# Patient Record
Sex: MALE | Race: OTHER | HISPANIC OR LATINO | Employment: OTHER | URBAN - METROPOLITAN AREA
[De-identification: names, ages, dates, MRNs, and addresses within clinical notes are randomized per-mention and may not be internally consistent; named-entity substitution may affect disease eponyms.]

---

## 2024-01-26 ENCOUNTER — HOSPITAL ENCOUNTER (EMERGENCY)
Facility: HOSPITAL | Age: 64
Discharge: HOME | End: 2024-01-26
Attending: EMERGENCY MEDICINE

## 2024-01-26 VITALS
RESPIRATION RATE: 18 BRPM | HEART RATE: 66 BPM | HEIGHT: 65 IN | SYSTOLIC BLOOD PRESSURE: 144 MMHG | OXYGEN SATURATION: 98 % | DIASTOLIC BLOOD PRESSURE: 84 MMHG | TEMPERATURE: 99 F | WEIGHT: 190 LBS | BODY MASS INDEX: 31.65 KG/M2

## 2024-01-26 DIAGNOSIS — M25.50 ARTHRALGIA, UNSPECIFIED JOINT: Primary | ICD-10-CM

## 2024-01-26 DIAGNOSIS — R52 PAIN: ICD-10-CM

## 2024-01-26 PROCEDURE — 99284 EMERGENCY DEPT VISIT MOD MDM: CPT

## 2024-01-26 RX ORDER — INDOMETHACIN 25 MG/1
25 CAPSULE ORAL 3 TIMES DAILY PRN
Qty: 12 CAPSULE | Refills: 0 | Status: SHIPPED | OUTPATIENT
Start: 2024-01-26

## 2024-01-26 RX ORDER — TRAMADOL HYDROCHLORIDE 50 MG/1
50 TABLET ORAL EVERY 6 HOURS PRN
Qty: 12 TABLET | Refills: 0 | Status: SHIPPED | OUTPATIENT
Start: 2024-01-26

## 2024-01-26 NOTE — ED TRIAGE NOTES
Pt complaint of swelling to right wrist over the past month that has worsened and pain to right ankle without known recent injury

## 2024-01-26 NOTE — Clinical Note
"Chad Schuster (Noel) was seen and treated in our emergency department on 1/26/2024.  He may return to work on 01/29/2024.       If you have any questions or concerns, please don't hesitate to call.      Meena QUICK    "

## 2024-01-26 NOTE — DISCHARGE INSTRUCTIONS
¡Sissy por dejarnos cuidar de usted hoy! Nuestro objetivo es brindarle remi atención norm y mantenerlo cómodo e informado. Si tiene alguna pregunta antes de partir, estaré encantado de intentar responderla.    Aquí tienes algunos consejos después de tu visita:      Wilks visita al departamento de emergencias NO es atención definitiva; realice un seguimiento con wilks médico de atención primaria y/o especialista dentro de 1 semana. Por favor regrese si wilks condición empeora o si tiene alguna otra inquietud.    Si se sometió a exámenes de radiología clark remi ANNA X o remi tomografía computarizada en el departamento de emergencias, la interpretación de ellos puede ser preliminar; es posible que haya resultados menos urgentes en los informes. Obtenga estos informes dentro de las 24 horas del hospital o utilizando wilks teléfono móvil. teléfono para acceder a los registros. Llévelos a wilks médico de atención primaria y/o especialista para remi revisión más detallada de los hallazgos incidentales.    Si le recetaron MEDICAMENTOS PARA EL DOLOR OPIÁTICOS, comprenda que estos medicamentos pueden ser adictivos, puede surtir menos cantidad de la receta indicada y no es necesario que tome la receta completa. Puedes desechar lo que no utilices. Busque ayuda si adrienne que tiene problemas de adicción. No conduzca ni opere maquinaria pesada si está tomando medicamentos sedantes. No mezcle estos medicamentos con alcohol.

## 2024-01-26 NOTE — ED PROVIDER NOTES
Encounter Date: 1/26/2024       History     Chief Complaint   Patient presents with    Hand Pain     Pt complaint of swelling to right wrist over the past month that has worsened and pain to right ankle without known recent injury     63 y.o. male presents to Emergency Department with a chief complaint of R wrist pain. Symptoms began 1 month ago and have been recurrent since onset. Associated symptoms include R ankle pain. Symptoms are aggravated with palpation and exertion and there are no alleviating factors. The patient denies CP, SOB, recent injury, dizziness, fever, or abdominal pain. No other reported symptoms at this time      The history is provided by the patient. A  was used.   General Illness   The current episode started several weeks ago. The problem occurs continuously. The problem has been unchanged. Pertinent negatives include no fever, no photophobia, no abdominal pain, no nausea, no vomiting, no stridor, no shortness of breath, no URI, no wheezing, no rash and no diaper rash. He has received no recent medical care.     Review of patient's allergies indicates:  No Known Allergies  History reviewed. No pertinent past medical history.  History reviewed. No pertinent surgical history.  No family history on file.     Review of Systems   Constitutional:  Negative for chills, fatigue and fever.   Eyes:  Negative for photophobia and visual disturbance.   Respiratory:  Negative for shortness of breath, wheezing and stridor.    Cardiovascular:  Negative for chest pain, palpitations and leg swelling.   Gastrointestinal:  Negative for abdominal pain, nausea and vomiting.   Musculoskeletal:  Positive for arthralgias and joint swelling.   Skin:  Negative for rash.   All other systems reviewed and are negative.      Physical Exam     Initial Vitals [01/26/24 1130]   BP Pulse Resp Temp SpO2   (!) 144/84 66 18 98.6 °F (37 °C) 98 %      MAP       --         Physical Exam    Nursing note and  vitals reviewed.  Constitutional: He appears well-developed and well-nourished. He is not diaphoretic. He is cooperative.  Non-toxic appearance. No distress.   HENT:   Head: Normocephalic and atraumatic.   Right Ear: External ear normal.   Left Ear: External ear normal.   Nose: Nose normal.   Mouth/Throat: Oropharynx is clear and moist.   Eyes: Conjunctivae and EOM are normal. Pupils are equal, round, and reactive to light.   Neck: Neck supple.   Normal range of motion.  Cardiovascular:  Normal rate, regular rhythm, S1 normal, S2 normal, normal heart sounds, intact distal pulses and normal pulses.           Pulmonary/Chest: Effort normal and breath sounds normal. No tachypnea and no bradypnea. No respiratory distress. He has no decreased breath sounds. He has no wheezes. He has no rhonchi. He has no rales. He exhibits no tenderness.   Abdominal: Abdomen is soft. Bowel sounds are normal. He exhibits no distension. There is no abdominal tenderness. There is no rebound.   Musculoskeletal:         General: Tenderness present. Normal range of motion.      Right wrist: Tenderness present. No swelling or effusion. Normal range of motion.      Left wrist: Normal.      Cervical back: Normal range of motion and neck supple.      Right ankle: Swelling present. No ecchymosis. Tenderness present. Normal range of motion. Normal pulse.      Left ankle: Normal.      Comments: Tenderness noted to R wrist and R ankle. Mild swelling to R ankle. Full 5/5 ROM noted. CMS intact. All other adjacent joints otherwise normal.        Neurological: He is alert and oriented to person, place, and time. He has normal strength. No sensory deficit. GCS score is 15. GCS eye subscore is 4. GCS verbal subscore is 5. GCS motor subscore is 6.   Skin: Skin is warm and dry. Capillary refill takes less than 2 seconds. No rash noted. No erythema.   Psychiatric: He has a normal mood and affect. Thought content normal.         ED Course   Procedures  Labs  Reviewed - No data to display       Imaging Results              X-Ray Ankle Complete Right (Final result)  Result time 01/26/24 12:53:55      Final result by Octavio Brasher MD (01/26/24 12:53:55)                   Impression:      No acute osseous abnormality.      Electronically signed by: Octavio Brasher  Date:    01/26/2024  Time:    12:53               Narrative:    EXAMINATION:  XR ANKLE COMPLETE 3 VIEW RIGHT    CLINICAL HISTORY:  Pain, unspecified    COMPARISON:  None.    FINDINGS:  No acute displaced fractures or dislocations.    Joint spaces preserved.    No blastic or lytic lesions.    Soft tissues within normal limits.                                       X-Ray Wrist Complete Right (Final result)  Result time 01/26/24 13:36:43      Final result by Norberto Osorio MD (01/26/24 13:36:43)                   Impression:      As above.      Electronically signed by: Norberto Osorio  Date:    01/26/2024  Time:    13:36               Narrative:    EXAMINATION:  XR WRIST COMPLETE 3 VIEWS RIGHT    CLINICAL HISTORY:  Pain, unspecified    TECHNIQUE:  Radiographs of the right wrist with AP, lateral and oblique  views.    COMPARISON:  No prior imaging available for comparison    FINDINGS:  No displaced fracture.  Mild degenerative changes with loss of radiocarpal interval.                                       Medications - No data to display  Medical Decision Making  Patient awake, alert, has non-labored breathing, and follows commands appropriately. C/o R wrist and R ankle pain. Denies injury/trauma. Symptoms began 1 month ago. Afebrile. NAD Noted.         Differential Diagnosis: Joint Pain, OA, Wrist Pain, Ankle Pain    Amount and/or Complexity of Data Reviewed  Radiology: ordered.     Details: XR wrist- No displaced fracture.  Mild degenerative changes with loss of radiocarpal interval. XR ankle-  No acute osseous abnormality. Informed patient of results.   Discussion of management or test interpretation  with external provider(s): Discussed plan of care and interventions with patient. Agreed to and aware of plan of care. Comfortable being discharged home. Patient discharged home. Patient denies new or additional complaints; no further tests indicated at this time. Verbalized understanding of instructions. No emergent or apparent distress noted prior to discharge. To follow up with PCP in 1 week as needed. Strict ER return precautions given. Number provided to establish PCP in paperwork.     Risk  OTC drugs.  Prescription drug management.                                      Clinical Impression:  Final diagnoses:  [M25.50] Arthralgia, unspecified joint (Primary)          ED Disposition Condition    Discharge Stable          ED Prescriptions       Medication Sig Dispense Start Date End Date Auth. Provider    indomethacin (INDOCIN) 25 MG capsule Take 1 capsule (25 mg total) by mouth 3 (three) times daily as needed (Take as needed three times a day for pain.). 12 capsule 1/26/2024 -- Ping Gutiérrez, NP    traMADoL (ULTRAM) 50 mg tablet Take 1 tablet (50 mg total) by mouth every 6 (six) hours as needed for Pain. 12 tablet 1/26/2024 -- Ping Gutiérrez, NP          Follow-up Information       Follow up With Specialties Details Why Contact Info    PCP  Call in 1 week [phone]     [facility] - Emergency Dept Emergency Medicine Go to  If symptoms worsen, As needed [id number]  [facility] [address] [phone]             Ping Gutiérrez NP  01/26/24 9164

## 2024-12-03 DIAGNOSIS — R30.0 DYSURIA: Primary | ICD-10-CM

## 2024-12-20 ENCOUNTER — HOSPITAL ENCOUNTER (EMERGENCY)
Facility: HOSPITAL | Age: 64
Discharge: HOME | End: 2024-12-20
Attending: STUDENT IN AN ORGANIZED HEALTH CARE EDUCATION/TRAINING PROGRAM

## 2024-12-20 VITALS
HEART RATE: 63 BPM | OXYGEN SATURATION: 100 % | DIASTOLIC BLOOD PRESSURE: 97 MMHG | WEIGHT: 180 LBS | BODY MASS INDEX: 28.93 KG/M2 | RESPIRATION RATE: 18 BRPM | TEMPERATURE: 98 F | SYSTOLIC BLOOD PRESSURE: 176 MMHG | HEIGHT: 66 IN

## 2024-12-20 DIAGNOSIS — N40.0 BENIGN PROSTATIC HYPERPLASIA, UNSPECIFIED WHETHER LOWER URINARY TRACT SYMPTOMS PRESENT: Primary | ICD-10-CM

## 2024-12-20 LAB
BACTERIA #/AREA URNS AUTO: NORMAL /HPF
BILIRUB UR QL STRIP.AUTO: NEGATIVE
CLARITY UR: CLEAR
COLOR UR AUTO: ABNORMAL
GLUCOSE UR QL STRIP: NEGATIVE
HGB UR QL STRIP: ABNORMAL
KETONES UR QL STRIP: NEGATIVE
LEUKOCYTE ESTERASE UR QL STRIP: ABNORMAL
NITRITE UR QL STRIP: NEGATIVE
PH UR STRIP: 7 [PH]
PROT UR QL STRIP: NEGATIVE
RBC #/AREA URNS AUTO: NORMAL /HPF
SP GR UR STRIP.AUTO: 1.01 (ref 1–1.03)
SQUAMOUS #/AREA URNS AUTO: NORMAL /HPF
UROBILINOGEN UR STRIP-ACNC: 0.2
WBC #/AREA URNS AUTO: NORMAL /HPF

## 2024-12-20 PROCEDURE — 99283 EMERGENCY DEPT VISIT LOW MDM: CPT

## 2024-12-20 PROCEDURE — 81003 URINALYSIS AUTO W/O SCOPE: CPT | Performed by: STUDENT IN AN ORGANIZED HEALTH CARE EDUCATION/TRAINING PROGRAM

## 2024-12-20 RX ORDER — TAMSULOSIN HYDROCHLORIDE 0.4 MG/1
0.4 CAPSULE ORAL DAILY
Qty: 30 CAPSULE | Refills: 0 | Status: SHIPPED | OUTPATIENT
Start: 2024-12-20 | End: 2025-01-19

## 2024-12-20 NOTE — ED TRIAGE NOTES
Pt complaint that he has pain from nicole catheter since insertion approx 4 weeks ago at [facility] and concerned at lack of direction for follow-up.

## 2024-12-20 NOTE — ED PROVIDER NOTES
Encounter Date: 12/20/2024       History     Chief Complaint   Patient presents with    Urinary Retention     Pt complaint that he has pain from nicole catheter since insertion approx 4 weeks ago at [facility] and concerned at lack of direction for follow-up.      The history is provided by the patient. The history is limited by a language barrier. A  was used.       64-year-old male with a past medical history BPH presents emergency department for evaluation of his Nicole catheter.  About a month ago he went to an outside facility secondary to urinary retention.  They placed a catheter in and discharged in.  Patient states that he has not had any follow up with urologist.  States he wants to have the catheter removed and also wants a referral to urologist.  Denies any other complaints.    Review of patient's allergies indicates:  No Known Allergies  History reviewed. No pertinent past medical history.  History reviewed. No pertinent surgical history.  No family history on file.  Social History     Tobacco Use    Smoking status: Unknown     Review of Systems   Constitutional:  Negative for fever.   Respiratory:  Negative for cough.    Cardiovascular:  Negative for chest pain.   Gastrointestinal:  Negative for abdominal pain, constipation, diarrhea, nausea and vomiting.   Neurological:  Negative for headaches.   All other systems reviewed and are negative.      Physical Exam     Initial Vitals [12/20/24 0949]   BP Pulse Resp Temp SpO2   (!) 176/97 63 18 98 °F (36.7 °C) 100 %      MAP       --         Physical Exam    Nursing note and vitals reviewed.  Constitutional: He appears well-developed and well-nourished. No distress.   Cardiovascular:  Normal rate and regular rhythm.           Pulmonary/Chest: Breath sounds normal. No respiratory distress.   Abdominal: Abdomen is soft. There is no abdominal tenderness.   Genitourinary:    Genitourinary Comments: Nicole catheter in place      Musculoskeletal:         General: No tenderness. Normal range of motion.     Neurological: He is alert and oriented to person, place, and time.   Skin: Skin is warm. Capillary refill takes less than 2 seconds.         ED Course   Procedures  Labs Reviewed   URINALYSIS, REFLEX TO URINE CULTURE - Abnormal       Result Value    Color, UA Straw      Appearance, UA Clear      Specific Gravity, UA 1.010      pH, UA 7.0      Protein, UA Negative      Glucose, UA Negative      Ketones, UA Negative      Blood, UA Large (*)     Bilirubin, UA Negative      Urobilinogen, UA 0.2      Nitrites, UA Negative      Leukocyte Esterase, UA Trace (*)    URINALYSIS, MICROSCOPIC - Normal    Bacteria, UA Rare      RBC, UA 3-5      WBC, UA 0-2      Squamous Epithelial Cells, UA None Seen            Imaging Results    None          Medications - No data to display  Medical Decision Making  Initial Assessment:       Marte catheter in place      Differential Diagnosis:   Judging by the patient's chief complaint and pertinent history, the patient has the following possible differential diagnoses, including but not limited to the following.  Some of these are deemed to be lower likelihood and some more likely based on my physical exam and history combined with possible lab work and/or imaging studies.   Please see the pertinent studies, and refer to the HPI.  Some of these diagnoses will take further evaluation to fully rule out, perhaps as an outpatient and the patient was encouraged to follow up when discharged for more comprehensive evaluation.      Urinary retention, BPH, UTI,  as well as multiple other possible etiologies      Problems Addressed:  Benign prostatic hyperplasia, unspecified whether lower urinary tract symptoms present: chronic illness or injury    Amount and/or Complexity of Data Reviewed  Labs:  Decision-making details documented in ED Course.    Risk  Prescription drug management.               ED Course as of 12/20/24  1130   Fri Dec 20, 2024   1003 Long discussion was held with patient's in regards to just changing out the catheter versus removal of the catheter.  Patient adamant that he wants to have the catheter removed.  I informed him he will have to urinate before he is discharged from the emergency department.  He states understanding.  Will prescribe him some more Flomax and get him into a urologist [BS]   1118 Patient was able to void 400cc with no difficulty [BS]   1119 Leukocyte Esterase, UA(!): Trace [BS]   1119 NITRITE UA: Negative [BS]   1130 Bacteria, UA: Rare [BS]      ED Course User Index  [BS] Isidro Gudino MD                             Clinical Impression:  Final diagnoses:  [N40.0] Benign prostatic hyperplasia, unspecified whether lower urinary tract symptoms present (Primary)          ED Disposition Condition    Discharge Stable          ED Prescriptions       Medication Sig Dispense Start Date End Date Auth. Provider    tamsulosin (FLOMAX) 0.4 mg Cap Take 1 capsule (0.4 mg total) by mouth once daily. 30 capsule 12/20/2024 1/19/2025 Isidro Gudino MD          Follow-up Information       Follow up With Specialties Details Why Contact Info    [facility] Urology Call today  [address] [facility] [id number] [phone]    [facility] - Emergency Dept Emergency Medicine Go to  If symptoms worsen [id number]  [facility] [id number] [phone]             Isidro Gudino MD  12/20/24 1134

## 2025-03-06 ENCOUNTER — OFFICE VISIT (OUTPATIENT)
Dept: UROLOGY | Facility: CLINIC | Age: 65
End: 2025-03-06

## 2025-03-06 VITALS
RESPIRATION RATE: 20 BRPM | SYSTOLIC BLOOD PRESSURE: 153 MMHG | HEIGHT: 65 IN | WEIGHT: 196.19 LBS | BODY MASS INDEX: 32.69 KG/M2 | HEART RATE: 55 BPM | TEMPERATURE: 98 F | DIASTOLIC BLOOD PRESSURE: 67 MMHG | OXYGEN SATURATION: 96 %

## 2025-03-06 DIAGNOSIS — R33.9 INCOMPLETE BLADDER EMPTYING: ICD-10-CM

## 2025-03-06 DIAGNOSIS — R33.9 URINE RETENTION: Primary | ICD-10-CM

## 2025-03-06 DIAGNOSIS — R39.12 WEAK URINE STREAM: ICD-10-CM

## 2025-03-06 DIAGNOSIS — R30.0 DYSURIA: ICD-10-CM

## 2025-03-06 LAB
BILIRUB SERPL-MCNC: NEGATIVE MG/DL
BLOOD URINE, POC: NEGATIVE
COLOR, POC UA: YELLOW
GLUCOSE UR QL STRIP: NEGATIVE
KETONES UR QL STRIP: NEGATIVE
LEUKOCYTE ESTERASE URINE, POC: NEGATIVE
NITRITE, POC UA: NEGATIVE
PH, POC UA: 7.5
POC RESIDUAL URINE VOLUME: 128 ML (ref 0–100)
PROTEIN, POC: NEGATIVE
SPECIFIC GRAVITY, POC UA: 1.02
UROBILINOGEN, POC UA: 0.2

## 2025-03-06 PROCEDURE — 51798 US URINE CAPACITY MEASURE: CPT | Mod: PBBFAC | Performed by: NURSE PRACTITIONER

## 2025-03-06 PROCEDURE — 99214 OFFICE O/P EST MOD 30 MIN: CPT | Mod: PBBFAC | Performed by: NURSE PRACTITIONER

## 2025-03-06 PROCEDURE — 81001 URINALYSIS AUTO W/SCOPE: CPT | Mod: PBBFAC | Performed by: NURSE PRACTITIONER

## 2025-03-06 RX ORDER — TAMSULOSIN HYDROCHLORIDE 0.4 MG/1
0.4 CAPSULE ORAL DAILY
Qty: 90 CAPSULE | Refills: 3 | Status: SHIPPED | OUTPATIENT
Start: 2025-03-06 | End: 2026-03-06

## 2025-03-06 NOTE — PROGRESS NOTES
Pt seen by GRICEL Loja; Bladder scan performed w/128 mls recorded; Pt instructed to return to clinic in 1 month w/bladder scan; Discharge paperwork given w/pt verbalizing understanding

## 2025-03-06 NOTE — PROGRESS NOTES
Chief Complaint:   Chief Complaint   Patient presents with    Dysuria     Having drops of urine.       HPI:   Patient is a 64-year-old male referred to Urology for dysuria and urinary retention.  Patient seen in emergency room on 12/20/2024 with past medical history BPH presents emergency department for evaluation of his Marte catheter. About a month ago he went to an outside facility secondary to urinary retention. They placed a catheter in and discharged in. Patient states that he has not had any follow up with urologist. States he wants to have the catheter removed and also wants a referral to urologist. Denies any other complaints.   Today patient denies any dysuria or abnormal urologic symptoms other than urinary frequency and a weak urine stream therefore I will start him on Flomax 0.4 mg p.o. daily RTC one-month for re-evaluation with bladder scan.  Bladder scan 128 mL.      Allergies:  Review of patient's allergies indicates:  No Known Allergies    Medications:  Current Medications[1]    Review of Systems:  General: No fever, chills, fatigability, or weight loss.  Skin: No rashes, itching, or changes in color or texture of skin.  Chest: Denies GODOY, cyanosis, wheezing, cough, and sputum production.  Abdomen: Appetite fine. No weight loss. Denies diarrhea, abdominal pain, hematemesis, or blood in stool.  Musculoskeletal: No joint stiffness or swelling. Denies back pain.  : As above.  All other review of systems negative.    PMH:  No past medical history on file.    PSH:  No past surgical history on file.    FamHx:  No family history on file.    SocHx:  Social History[2]    Physical Exam:  Vitals:    03/06/25 1006   BP: (!) 153/67   Pulse: (!) 55   Resp: 20   Temp: 98.1 °F (36.7 °C)     General: A&Ox3, no apparent distress, no deformities  Neck: No masses, normal thyroid  Lungs: CTA mariela, no use of accessory muscles  Heart: RRR, no arrhythmias  Abdomen: Soft, NT, ND, no masses, no hernias, no  hepatosplenomegaly  Lymphatic: Neck and groin nodes negative  Skin: The skin is warm and dry. No jaundice.  Ext: No c/c/e.    GUMale:  Deferred MAUREEN this time  Urinalysis:  Results for orders placed or performed in visit on 03/06/25   POCT URINE DIPSTICK WITH MICROSCOPE, AUTOMATED   Result Value Ref Range    Color, UA Yellow     Spec Grav UA 1.020     pH, UA 7.5     WBC, UA Negative     Nitrite, UA Negative     Protein, POC Negative     Glucose, UA Negative     Ketones, UA Negative     Urobilinogen, UA 0.2     Bilirubin, POC Negative     Blood, UA Negative    Microscopic urinalysis negative for WBCs, nitrites, RBCs        Impression:  1. Dysuria  - Ambulatory referral/consult to Urology  - POCT URINE DIPSTICK WITH MICROSCOPE, AUTOMATED  - POCT Bladder Scan  2. Incomplete bladder emptying    Plan:  Instructed patient to restart Flomax 0.4 mg p.o. daily.  Instructed patient on timed voiding every 2-3 hrs, double voiding, avoidance of bladder irritants such as alcohol, citrus foods, chocolate, caffeinated drinks, energy drinks, spicy foods, sugar, caffeine products, sodas. Instructed patient to avoid drinking fluids 1-2 hours prior to bedtime & void immediately before bedtime.   RTC one-month with bladder scan.  Instructed patient if develops any abnormal urologic symptoms notify clinic to be re-evaluate treated or during after hours go to emergency room versus urgent here.                           GS[facility]         [1]   Current Outpatient Medications   Medication Sig Dispense Refill    traMADoL (ULTRAM) 50 mg tablet Take 1 tablet (50 mg total) by mouth every 6 (six) hours as needed for Pain. 12 tablet 0    indomethacin (INDOCIN) 25 MG capsule Take 1 capsule (25 mg total) by mouth 3 (three) times daily as needed (Take as needed three times a day for pain.). (Patient not taking: Reported on 3/6/2025) 12 capsule 0    tamsulosin (FLOMAX) 0.4 mg Cap Take 1 capsule (0.4 mg total) by mouth once daily. 30 capsule 0     No current  facility-administered medications for this visit.   [2]   Social History  Socioeconomic History    Marital status: Single   Tobacco Use    Smoking status: Never     Passive exposure: Never    Smokeless tobacco: Never